# Patient Record
Sex: MALE | Race: WHITE | Employment: UNEMPLOYED | URBAN - NONMETROPOLITAN AREA
[De-identification: names, ages, dates, MRNs, and addresses within clinical notes are randomized per-mention and may not be internally consistent; named-entity substitution may affect disease eponyms.]

---

## 2017-09-21 ENCOUNTER — APPOINTMENT (OUTPATIENT)
Dept: GENERAL RADIOLOGY | Age: 47
End: 2017-09-21
Payer: OTHER GOVERNMENT

## 2017-09-21 ENCOUNTER — HOSPITAL ENCOUNTER (EMERGENCY)
Age: 47
Discharge: HOME | End: 2017-09-21
Attending: FAMILY MEDICINE
Payer: OTHER GOVERNMENT

## 2017-09-21 VITALS
BODY MASS INDEX: 22.81 KG/M2 | DIASTOLIC BLOOD PRESSURE: 98 MMHG | TEMPERATURE: 98.3 F | HEIGHT: 69 IN | OXYGEN SATURATION: 98 % | WEIGHT: 154 LBS | HEART RATE: 95 BPM | SYSTOLIC BLOOD PRESSURE: 136 MMHG | RESPIRATION RATE: 20 BRPM
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
## 2017-09-21 DIAGNOSIS — S81.812A LEG LACERATION, LEFT, INITIAL ENCOUNTER: ICD-10-CM

## 2017-09-21 DIAGNOSIS — T07.XXXA MULTIPLE CONTUSIONS: ICD-10-CM

## 2017-09-21 PROCEDURE — 73552 X-RAY EXAM OF FEMUR 2/>: CPT

## 2017-09-21 PROCEDURE — A6446 CONFORM BAND S W>=3" <5"/YD: HCPCS

## 2017-09-21 PROCEDURE — 6370000000 HC RX 637 (ALT 250 FOR IP): Performed by: FAMILY MEDICINE

## 2017-09-21 PROCEDURE — 12001 RPR S/N/AX/GEN/TRNK 2.5CM/<: CPT

## 2017-09-21 PROCEDURE — 99284 EMERGENCY DEPT VISIT MOD MDM: CPT

## 2017-09-21 PROCEDURE — 73590 X-RAY EXAM OF LOWER LEG: CPT

## 2017-09-21 PROCEDURE — 2500000003 HC RX 250 WO HCPCS

## 2017-09-21 PROCEDURE — 2580000003 HC RX 258: Performed by: FAMILY MEDICINE

## 2017-09-21 PROCEDURE — A6222 GAUZE <=16 IN NO W/SAL W/O B: HCPCS

## 2017-09-21 RX ORDER — LEVOFLOXACIN 500 MG/1
500 TABLET, FILM COATED ORAL ONCE
Status: COMPLETED | OUTPATIENT
Start: 2017-09-21 | End: 2017-09-21

## 2017-09-21 RX ORDER — LIDOCAINE HYDROCHLORIDE 20 MG/ML
INJECTION, SOLUTION INFILTRATION; PERINEURAL
Status: COMPLETED
Start: 2017-09-21 | End: 2017-09-21

## 2017-09-21 RX ORDER — NAPROXEN 500 MG/1
500 TABLET ORAL 2 TIMES DAILY WITH MEALS
Qty: 20 TABLET | Refills: 0 | Status: SHIPPED | OUTPATIENT
Start: 2017-09-21 | End: 2017-12-04

## 2017-09-21 RX ORDER — KETOROLAC TROMETHAMINE 30 MG/ML
30 INJECTION, SOLUTION INTRAMUSCULAR; INTRAVENOUS ONCE
Status: DISCONTINUED | OUTPATIENT
Start: 2017-09-21 | End: 2017-09-22 | Stop reason: HOSPADM

## 2017-09-21 RX ORDER — SODIUM CHLORIDE 9 MG/ML
INJECTION, SOLUTION INTRAVENOUS CONTINUOUS
Status: DISCONTINUED | OUTPATIENT
Start: 2017-09-21 | End: 2017-09-22 | Stop reason: HOSPADM

## 2017-09-21 RX ORDER — LEVOFLOXACIN 500 MG/1
500 TABLET, FILM COATED ORAL DAILY
Qty: 6 TABLET | Refills: 0 | Status: SHIPPED | OUTPATIENT
Start: 2017-09-21 | End: 2017-09-27

## 2017-09-21 RX ADMIN — LEVOFLOXACIN 500 MG: 500 TABLET, FILM COATED ORAL at 17:26

## 2017-09-21 RX ADMIN — LIDOCAINE HYDROCHLORIDE: 20 INJECTION, SOLUTION INFILTRATION; PERINEURAL at 17:29

## 2017-09-21 RX ADMIN — SODIUM CHLORIDE: 9 INJECTION, SOLUTION INTRAVENOUS at 16:34

## 2017-09-21 ASSESSMENT — PAIN SCALES - GENERAL: PAINLEVEL_OUTOF10: 9

## 2017-09-21 ASSESSMENT — PAIN DESCRIPTION - PAIN TYPE: TYPE: ACUTE PAIN

## 2017-09-21 ASSESSMENT — ENCOUNTER SYMPTOMS
FACIAL SWELLING: 0
SHORTNESS OF BREATH: 0
NAUSEA: 0
VOMITING: 0
ABDOMINAL PAIN: 0

## 2017-09-21 ASSESSMENT — PAIN DESCRIPTION - LOCATION: LOCATION: LEG

## 2017-09-21 ASSESSMENT — PAIN DESCRIPTION - ORIENTATION: ORIENTATION: LEFT;LOWER

## 2017-12-04 ENCOUNTER — APPOINTMENT (OUTPATIENT)
Dept: CT IMAGING | Age: 47
End: 2017-12-04
Payer: OTHER GOVERNMENT

## 2017-12-04 ENCOUNTER — HOSPITAL ENCOUNTER (EMERGENCY)
Age: 47
Discharge: HOME | End: 2017-12-04
Payer: OTHER GOVERNMENT

## 2017-12-04 ENCOUNTER — APPOINTMENT (OUTPATIENT)
Dept: GENERAL RADIOLOGY | Age: 47
End: 2017-12-04
Payer: OTHER GOVERNMENT

## 2017-12-04 VITALS
BODY MASS INDEX: 22.9 KG/M2 | HEART RATE: 85 BPM | TEMPERATURE: 98.1 F | SYSTOLIC BLOOD PRESSURE: 136 MMHG | OXYGEN SATURATION: 97 % | HEIGHT: 70 IN | WEIGHT: 160 LBS | DIASTOLIC BLOOD PRESSURE: 91 MMHG | RESPIRATION RATE: 14 BRPM

## 2017-12-04 DIAGNOSIS — S22.41XA CLOSED FRACTURE OF MULTIPLE RIBS OF RIGHT SIDE, INITIAL ENCOUNTER: Primary | ICD-10-CM

## 2017-12-04 LAB
ALBUMIN SERPL-MCNC: 4.4 G/DL (ref 3.5–5.1)
ALP BLD-CCNC: 89 U/L (ref 38–126)
ALT SERPL-CCNC: 13 U/L (ref 11–66)
ANION GAP SERPL CALCULATED.3IONS-SCNC: 14 MEQ/L (ref 8–16)
AST SERPL-CCNC: 17 U/L (ref 5–40)
BASOPHILS # BLD: 1 %
BASOPHILS ABSOLUTE: 0.1 THOU/MM3 (ref 0–0.1)
BILIRUB SERPL-MCNC: 0.5 MG/DL (ref 0.3–1.2)
BILIRUBIN DIRECT: < 0.2 MG/DL (ref 0–0.3)
BUN BLDV-MCNC: 11 MG/DL (ref 7–22)
CALCIUM SERPL-MCNC: 9.3 MG/DL (ref 8.5–10.5)
CHLORIDE BLD-SCNC: 100 MEQ/L (ref 98–111)
CO2: 25 MEQ/L (ref 23–33)
CREAT SERPL-MCNC: 0.8 MG/DL (ref 0.4–1.2)
EKG ATRIAL RATE: 84 BPM
EKG P AXIS: 66 DEGREES
EKG P-R INTERVAL: 170 MS
EKG Q-T INTERVAL: 344 MS
EKG QRS DURATION: 82 MS
EKG QTC CALCULATION (BAZETT): 406 MS
EKG R AXIS: 79 DEGREES
EKG T AXIS: 62 DEGREES
EKG VENTRICULAR RATE: 84 BPM
EOSINOPHIL # BLD: 8.9 %
EOSINOPHILS ABSOLUTE: 0.7 THOU/MM3 (ref 0–0.4)
GFR SERPL CREATININE-BSD FRML MDRD: > 90 ML/MIN/1.73M2
GLUCOSE BLD-MCNC: 107 MG/DL (ref 70–108)
HCT VFR BLD CALC: 43.4 % (ref 42–52)
HEMOGLOBIN: 14.3 GM/DL (ref 14–18)
LACTIC ACID: 1 MMOL/L (ref 0.5–2.2)
LIPASE: 16.9 U/L (ref 5.6–51.3)
LYMPHOCYTES # BLD: 36.3 %
LYMPHOCYTES ABSOLUTE: 2.8 THOU/MM3 (ref 1–4.8)
MCH RBC QN AUTO: 30.3 PG (ref 27–31)
MCHC RBC AUTO-ENTMCNC: 33 GM/DL (ref 33–37)
MCV RBC AUTO: 91.7 FL (ref 80–94)
MONOCYTES # BLD: 8.4 %
MONOCYTES ABSOLUTE: 0.7 THOU/MM3 (ref 0.4–1.3)
NUCLEATED RED BLOOD CELLS: 0 /100 WBC
OSMOLALITY CALCULATION: 277.4 MOSMOL/KG (ref 275–300)
PDW BLD-RTO: 13.9 % (ref 11.5–14.5)
PLATELET # BLD: 383 THOU/MM3 (ref 130–400)
PMV BLD AUTO: 8.1 MCM (ref 7.4–10.4)
POTASSIUM SERPL-SCNC: 4.3 MEQ/L (ref 3.5–5.2)
RBC # BLD: 4.73 MILL/MM3 (ref 4.7–6.1)
SEG NEUTROPHILS: 45.4 %
SEGMENTED NEUTROPHILS ABSOLUTE COUNT: 3.5 THOU/MM3 (ref 1.8–7.7)
SODIUM BLD-SCNC: 139 MEQ/L (ref 135–145)
TOTAL PROTEIN: 7.1 G/DL (ref 6.1–8)
TROPONIN T: < 0.01 NG/ML
WBC # BLD: 7.8 THOU/MM3 (ref 4.8–10.8)

## 2017-12-04 PROCEDURE — 83605 ASSAY OF LACTIC ACID: CPT

## 2017-12-04 PROCEDURE — 99285 EMERGENCY DEPT VISIT HI MDM: CPT

## 2017-12-04 PROCEDURE — 76376 3D RENDER W/INTRP POSTPROCES: CPT

## 2017-12-04 PROCEDURE — 85025 COMPLETE CBC W/AUTO DIFF WBC: CPT

## 2017-12-04 PROCEDURE — 96374 THER/PROPH/DIAG INJ IV PUSH: CPT

## 2017-12-04 PROCEDURE — 84484 ASSAY OF TROPONIN QUANT: CPT

## 2017-12-04 PROCEDURE — 6370000000 HC RX 637 (ALT 250 FOR IP): Performed by: PHYSICIAN ASSISTANT

## 2017-12-04 PROCEDURE — 71100 X-RAY EXAM RIBS UNI 2 VIEWS: CPT

## 2017-12-04 PROCEDURE — 82248 BILIRUBIN DIRECT: CPT

## 2017-12-04 PROCEDURE — 74177 CT ABD & PELVIS W/CONTRAST: CPT

## 2017-12-04 PROCEDURE — 93005 ELECTROCARDIOGRAM TRACING: CPT

## 2017-12-04 PROCEDURE — 83690 ASSAY OF LIPASE: CPT

## 2017-12-04 PROCEDURE — 36415 COLL VENOUS BLD VENIPUNCTURE: CPT

## 2017-12-04 PROCEDURE — 6360000002 HC RX W HCPCS: Performed by: PHYSICIAN ASSISTANT

## 2017-12-04 PROCEDURE — 80053 COMPREHEN METABOLIC PANEL: CPT

## 2017-12-04 PROCEDURE — 6360000004 HC RX CONTRAST MEDICATION: Performed by: PHYSICIAN ASSISTANT

## 2017-12-04 RX ORDER — IBUPROFEN 800 MG/1
800 TABLET ORAL EVERY 8 HOURS PRN
Qty: 30 TABLET | Refills: 0 | Status: SHIPPED | OUTPATIENT
Start: 2017-12-04

## 2017-12-04 RX ORDER — TAMSULOSIN HYDROCHLORIDE 0.4 MG/1
0.4 CAPSULE ORAL DAILY
Status: DISCONTINUED | OUTPATIENT
Start: 2017-12-04 | End: 2017-12-04 | Stop reason: HOSPADM

## 2017-12-04 RX ORDER — KETOROLAC TROMETHAMINE 30 MG/ML
30 INJECTION, SOLUTION INTRAMUSCULAR; INTRAVENOUS ONCE
Status: COMPLETED | OUTPATIENT
Start: 2017-12-04 | End: 2017-12-04

## 2017-12-04 RX ADMIN — IOPAMIDOL 80 ML: 755 INJECTION, SOLUTION INTRAVENOUS at 08:40

## 2017-12-04 RX ADMIN — TAMSULOSIN HYDROCHLORIDE 0.4 MG: 0.4 CAPSULE ORAL at 07:58

## 2017-12-04 RX ADMIN — KETOROLAC TROMETHAMINE 30 MG: 30 INJECTION, SOLUTION INTRAMUSCULAR at 07:58

## 2017-12-04 ASSESSMENT — PAIN SCALES - GENERAL
PAINLEVEL_OUTOF10: 6
PAINLEVEL_OUTOF10: 10
PAINLEVEL_OUTOF10: 0
PAINLEVEL_OUTOF10: 10

## 2017-12-04 ASSESSMENT — PAIN DESCRIPTION - LOCATION
LOCATION: BACK;FLANK
LOCATION: BACK

## 2017-12-04 ASSESSMENT — ENCOUNTER SYMPTOMS
NAUSEA: 0
VOMITING: 0
BACK PAIN: 0
RHINORRHEA: 0
WHEEZING: 0
COUGH: 0
CONSTIPATION: 0
EYE DISCHARGE: 0
EYE REDNESS: 0
SHORTNESS OF BREATH: 0
ABDOMINAL PAIN: 0
DIARRHEA: 0
SORE THROAT: 0
COLOR CHANGE: 0

## 2017-12-04 ASSESSMENT — PAIN DESCRIPTION - ORIENTATION
ORIENTATION: RIGHT
ORIENTATION: RIGHT;UPPER

## 2017-12-04 ASSESSMENT — PAIN DESCRIPTION - PAIN TYPE
TYPE: ACUTE PAIN
TYPE: ACUTE PAIN

## 2017-12-04 ASSESSMENT — PAIN DESCRIPTION - FREQUENCY: FREQUENCY: CONTINUOUS

## 2017-12-04 NOTE — ED NOTES
Pt reports reduction of pain after Toradol. EKG completed. Call light in reach.      Carmita Knowles RN  12/04/17 208

## 2017-12-04 NOTE — ED NOTES
Bed: 024A  Expected date: 12/4/17  Expected time: 6:51 AM  Means of arrival: [facility]  Comments:     Kathee Boas, RN  12/04/17 5446

## 2017-12-04 NOTE — ED PROVIDER NOTES
[facility] [address] [address] EMERGENCY DEPT      CHIEF COMPLAINT       Chief Complaint   Patient presents with    Back Pain       Nurses Notes reviewed and I agree except as noted in the HPI. HISTORY OF PRESENT ILLNESS    Janel Verdin is a 55 y.o. male who presents to the emergency department for evaluation of right flank pain. The patient has a history of lumbar degenerative disc disease and polysubstance abuse. The patient is requesting no narcotic pain medications during his stay. The patient states that he has had right flank pain for the past several days which became significantly worse last night. He denies any known injury or increase in his chronic low back pain. The patient states that this pain radiates around the right side of his upper flank, but he denies chest or abdominal pain. The patient rates his current right flank pain as a continuous 10/10. The patient also reports having wrestled with a friend of his approximate 10 days ago and falling onto his right side. He stated that his right ribs are sore after this incident but that the pain improved. He denied any swelling or bruising at that time. The patient denies any burning or pain with urination or blood in his urine. He denies urinary urgency, frequency, hesitancy, or retention. He denies a history of kidney stones. He denies nausea, vomiting, diarrhea, or constipation. He denies shortness of breath, fevers, or chills. The patient has no additional complaints at this time. REVIEW OF SYSTEMS     Review of Systems   Constitutional: Negative for chills, fatigue and fever. HENT: Negative for congestion, ear pain, rhinorrhea and sore throat. Eyes: Negative for discharge and redness. Respiratory: Negative for cough, shortness of breath and wheezing. Cardiovascular: Negative for chest pain and palpitations. Gastrointestinal: Negative for abdominal pain, constipation, diarrhea, nausea and vomiting.    Genitourinary: Positive for flank pain (right). Negative for decreased urine volume, difficulty urinating, dysuria, frequency, hematuria and urgency. Musculoskeletal: Negative for arthralgias, back pain, myalgias, neck pain and neck stiffness. Skin: Negative for color change, pallor and rash. Neurological: Negative for dizziness, syncope, weakness, light-headedness, numbness and headaches. Hematological: Negative for adenopathy. Psychiatric/Behavioral: Negative for agitation, confusion, dysphoric mood and suicidal ideas. The patient is not nervous/anxious. PAST MEDICAL HISTORY    has a past medical history of Asthma; DDD (degenerative disc disease), lumbar; and PTSD (post-traumatic stress disorder). SURGICAL HISTORY      has a past surgical history that includes fracture surgery. CURRENT MEDICATIONS       Discharge Medication List as of 12/4/2017 10:54 AM      CONTINUE these medications which have NOT CHANGED    Details   ALBUTEROL SULFATE IN Inhale  into the lungs. ALLERGIES     is allergic to benadryl [diphenhydramine]; other; and pcn [penicillins]. FAMILY HISTORY     has no family status information on file. family history is not on file. SOCIAL HISTORY      reports that he has been smoking Cigars. He has been smoking about 0.50 packs per day. He has never used smokeless tobacco. He reports that he drinks alcohol. He reports that he uses drugs, including Opiates  and Cocaine. PHYSICAL EXAM     INITIAL VITALS:  height is 5' 10\" (1.778 m) and weight is 160 lb (72.6 kg). His oral temperature is 98.1 °F (36.7 °C). His blood pressure is 136/91 (abnormal) and his pulse is 85. His respiration is 14 and oxygen saturation is 97%. Physical Exam   Constitutional: He is oriented to person, place, and time. He appears well-developed and well-nourished. No distress. HENT:   Head: Normocephalic and atraumatic.    Right Ear: External ear normal.   Left Ear: External ear normal.   Nose: Nose normal. Mouth/Throat: Oropharynx is clear and moist.   Eyes: Conjunctivae and EOM are normal. Pupils are equal, round, and reactive to light. Right eye exhibits no discharge. Left eye exhibits no discharge. No scleral icterus. Neck: Normal range of motion. Neck supple. Cardiovascular: Normal rate, regular rhythm and normal heart sounds. Exam reveals no gallop and no friction rub. No murmur heard. Pulmonary/Chest: Effort normal and breath sounds normal. No respiratory distress. He has no wheezes. He has no rales. He exhibits no tenderness. Abdominal: Soft. Bowel sounds are normal. He exhibits no distension and no mass. There is no hepatosplenomegaly. There is no tenderness. There is CVA tenderness (right). There is no rigidity, no rebound, no guarding, no tenderness at McBurney's point and negative Lindsay's sign. No hernia. Musculoskeletal: Normal range of motion. He exhibits tenderness. He exhibits no edema or deformity. Cervical back: Normal. He exhibits normal range of motion, no tenderness, no swelling and no deformity. Thoracic back: Normal. He exhibits normal range of motion, no tenderness, no swelling and no deformity. Lumbar back: He exhibits tenderness. He exhibits normal range of motion, no swelling and no deformity. There is right lumbar paraspinal tenderness but no midline lumbar spinal tenderness to palpation. Lymphadenopathy:     He has no cervical adenopathy. Neurological: He is alert and oriented to person, place, and time. He exhibits normal muscle tone. Coordination normal. GCS eye subscore is 4. GCS verbal subscore is 5. GCS motor subscore is 6. Skin: Skin is warm and dry. No rash noted. He is not diaphoretic. No erythema. No pallor. Psychiatric: He has a normal mood and affect. His behavior is normal. Thought content normal.   Nursing note and vitals reviewed.             DIFFERENTIAL DIAGNOSIS:   Includes but not limited to: Kidney or ureteral stone, UTI, pyelonephritis, paraspinal strain vs sprain vs paraspinal muscle spasm, herniated disc, degenerative disc disease. Low suspicion for: vertebral subluxation or fracture, discitis, epidural abscess, central canal compromise, or AAA based on history and physical exam.    DIAGNOSTIC RESULTS     EKG: All EKG's are interpreted by the Emergency Department Physician who either signs or Co-signs this chart in the absence of a cardiologist.  Signa Albino. Rate: 84 bpm  WA interval: 170 ms  QRS duration: 82 ms  QTc: 344/406 ms  P-R-T axes: 66, 79, 62  Normal sinus rhythm. Early repolarization. Normal EKG. No STEMI. RADIOLOGY: non-plain film images(s) such as CT, Ultrasound and MRI are read by the radiologist.  Plain radiographic images are visualized and preliminarily interpreted by the emergency physician unless otherwise stated below. XR RIBS RIGHT (2 VIEWS)   Final Result      1. Acute nondisplaced fractures of the anterior aspects of the right sixth and seventh ribs. 2. No pneumothorax. **This report has been created using voice recognition software. It may contain minor errors which are inherent in voice recognition technology. **      Final report electronically signed by Dr. Haroldo Tong on 12/4/2017 10:34 AM      CT ABDOMEN PELVIS W IV CONTRAST Additional Contrast? None   Final Result    No evidence of an acute process in the abdomen or pelvis. **This report has been created using voice recognition software. It may contain minor errors which are inherent in voice recognition technology. **      Final report electronically signed by Dr. Haroldo Tong on 12/4/2017 9:20 AM      CT Lumbar Reconstruction WO Post Process   Final Result   Degenerative changes L5-S1 level, as described above. No acute findings. **This report has been created using voice recognition software. It may contain minor errors which are inherent in voice recognition technology. **      Final report electronically

## 2017-12-04 NOTE — ED NOTES
Pt resting on cot with eyes closed. Arouses easily to voice. Denies pain. Awaiting all results and plan of care. Rails up x 2.      Carmita Knowles RN  12/04/17 0396

## 2020-05-17 ENCOUNTER — HOSPITAL ENCOUNTER (INPATIENT)
Age: 50
LOS: 1 days | Discharge: LEFT BEFORE DISCHARGE | DRG: 439 | End: 2020-05-18
Attending: INTERNAL MEDICINE | Admitting: INTERNAL MEDICINE
Payer: OTHER GOVERNMENT

## 2020-05-17 ENCOUNTER — APPOINTMENT (OUTPATIENT)
Dept: CT IMAGING | Age: 50
DRG: 439 | End: 2020-05-17
Payer: OTHER GOVERNMENT

## 2020-05-17 PROBLEM — K85.90 ACUTE ON CHRONIC PANCREATITIS (HCC): Status: ACTIVE | Noted: 2020-05-17

## 2020-05-17 PROBLEM — K86.1 ACUTE ON CHRONIC PANCREATITIS (HCC): Status: ACTIVE | Noted: 2020-05-17

## 2020-05-17 LAB
ALBUMIN SERPL-MCNC: 4.9 G/DL (ref 3.5–5.1)
ALP BLD-CCNC: 89 U/L (ref 38–126)
ALT SERPL-CCNC: 15 U/L (ref 11–66)
AMPHETAMINE+METHAMPHETAMINE URINE SCREEN: NEGATIVE
ANION GAP SERPL CALCULATED.3IONS-SCNC: 17 MEQ/L (ref 8–16)
AST SERPL-CCNC: 25 U/L (ref 5–40)
BACTERIA: ABNORMAL /HPF
BARBITURATE QUANTITATIVE URINE: NEGATIVE
BASOPHILS # BLD: 0.3 %
BASOPHILS ABSOLUTE: 0 THOU/MM3 (ref 0–0.1)
BENZODIAZEPINE QUANTITATIVE URINE: NEGATIVE
BILIRUB SERPL-MCNC: 0.8 MG/DL (ref 0.3–1.2)
BILIRUBIN DIRECT: < 0.2 MG/DL (ref 0–0.3)
BILIRUBIN URINE: NEGATIVE
BLOOD, URINE: ABNORMAL
BUN BLDV-MCNC: 12 MG/DL (ref 7–22)
CALCIUM SERPL-MCNC: 10.1 MG/DL (ref 8.5–10.5)
CANNABINOID QUANTITATIVE URINE: NEGATIVE
CASTS 2: ABNORMAL /LPF
CASTS UA: ABNORMAL /LPF
CHARACTER, URINE: CLEAR
CHLORIDE BLD-SCNC: 94 MEQ/L (ref 98–111)
CO2: 23 MEQ/L (ref 23–33)
COCAINE METABOLITE QUANTITATIVE URINE: POSITIVE
COLOR: YELLOW
CREAT SERPL-MCNC: 0.8 MG/DL (ref 0.4–1.2)
CRYSTALS, UA: ABNORMAL
EKG ATRIAL RATE: 92 BPM
EKG P AXIS: 59 DEGREES
EKG P-R INTERVAL: 134 MS
EKG Q-T INTERVAL: 358 MS
EKG QRS DURATION: 84 MS
EKG QTC CALCULATION (BAZETT): 442 MS
EKG R AXIS: 78 DEGREES
EKG T AXIS: 73 DEGREES
EKG VENTRICULAR RATE: 92 BPM
EOSINOPHIL # BLD: 0.4 %
EOSINOPHILS ABSOLUTE: 0.1 THOU/MM3 (ref 0–0.4)
EPITHELIAL CELLS, UA: ABNORMAL /HPF
ERYTHROCYTE [DISTWIDTH] IN BLOOD BY AUTOMATED COUNT: 14 % (ref 11.5–14.5)
ERYTHROCYTE [DISTWIDTH] IN BLOOD BY AUTOMATED COUNT: 47.9 FL (ref 35–45)
GFR SERPL CREATININE-BSD FRML MDRD: > 90 ML/MIN/1.73M2
GLUCOSE BLD-MCNC: 125 MG/DL (ref 70–108)
GLUCOSE URINE: NEGATIVE MG/DL
HCT VFR BLD CALC: 46.6 % (ref 42–52)
HEMOGLOBIN: 15.2 GM/DL (ref 14–18)
IMMATURE GRANS (ABS): 0.04 THOU/MM3 (ref 0–0.07)
IMMATURE GRANULOCYTES: 0.3 %
KETONES, URINE: 80
LEUKOCYTE ESTERASE, URINE: NEGATIVE
LIPASE: 2457.3 U/L (ref 5.6–51.3)
LYMPHOCYTES # BLD: 12.2 %
LYMPHOCYTES ABSOLUTE: 1.6 THOU/MM3 (ref 1–4.8)
MCH RBC QN AUTO: 30.3 PG (ref 26–33)
MCHC RBC AUTO-ENTMCNC: 32.6 GM/DL (ref 32.2–35.5)
MCV RBC AUTO: 93 FL (ref 80–94)
MISCELLANEOUS 2: ABNORMAL
MONOCYTES # BLD: 8 %
MONOCYTES ABSOLUTE: 1.1 THOU/MM3 (ref 0.4–1.3)
NITRITE, URINE: NEGATIVE
NUCLEATED RED BLOOD CELLS: 0 /100 WBC
OPIATES, URINE: POSITIVE
OSMOLALITY CALCULATION: 269.5 MOSMOL/KG (ref 275–300)
OXYCODONE: NEGATIVE
PH UA: 5 (ref 5–9)
PHENCYCLIDINE QUANTITATIVE URINE: NEGATIVE
PLATELET # BLD: 250 THOU/MM3 (ref 130–400)
PMV BLD AUTO: 9.8 FL (ref 9.4–12.4)
POTASSIUM SERPL-SCNC: 4.1 MEQ/L (ref 3.5–5.2)
PROTEIN UA: 100
RBC # BLD: 5.01 MILL/MM3 (ref 4.7–6.1)
RBC URINE: ABNORMAL /HPF
RENAL EPITHELIAL, UA: ABNORMAL
SEG NEUTROPHILS: 78.8 %
SEGMENTED NEUTROPHILS ABSOLUTE COUNT: 10.4 THOU/MM3 (ref 1.8–7.7)
SODIUM BLD-SCNC: 134 MEQ/L (ref 135–145)
SPECIFIC GRAVITY, URINE: > 1.03 (ref 1–1.03)
TOTAL PROTEIN: 8 G/DL (ref 6.1–8)
TROPONIN T: < 0.01 NG/ML
UROBILINOGEN, URINE: 0.2 EU/DL (ref 0–1)
WBC # BLD: 13.2 THOU/MM3 (ref 4.8–10.8)
WBC UA: ABNORMAL /HPF
YEAST: ABNORMAL

## 2020-05-17 PROCEDURE — 6360000004 HC RX CONTRAST MEDICATION: Performed by: PHYSICIAN ASSISTANT

## 2020-05-17 PROCEDURE — 83690 ASSAY OF LIPASE: CPT

## 2020-05-17 PROCEDURE — 96375 TX/PRO/DX INJ NEW DRUG ADDON: CPT

## 2020-05-17 PROCEDURE — 6360000002 HC RX W HCPCS

## 2020-05-17 PROCEDURE — 96374 THER/PROPH/DIAG INJ IV PUSH: CPT

## 2020-05-17 PROCEDURE — 36415 COLL VENOUS BLD VENIPUNCTURE: CPT

## 2020-05-17 PROCEDURE — 80307 DRUG TEST PRSMV CHEM ANLYZR: CPT

## 2020-05-17 PROCEDURE — 6360000002 HC RX W HCPCS: Performed by: INTERNAL MEDICINE

## 2020-05-17 PROCEDURE — 80053 COMPREHEN METABOLIC PANEL: CPT

## 2020-05-17 PROCEDURE — 93005 ELECTROCARDIOGRAM TRACING: CPT | Performed by: PHYSICIAN ASSISTANT

## 2020-05-17 PROCEDURE — 6360000002 HC RX W HCPCS: Performed by: PHYSICIAN ASSISTANT

## 2020-05-17 PROCEDURE — 6370000000 HC RX 637 (ALT 250 FOR IP): Performed by: INTERNAL MEDICINE

## 2020-05-17 PROCEDURE — 1200000003 HC TELEMETRY R&B

## 2020-05-17 PROCEDURE — 74177 CT ABD & PELVIS W/CONTRAST: CPT

## 2020-05-17 PROCEDURE — 84484 ASSAY OF TROPONIN QUANT: CPT

## 2020-05-17 PROCEDURE — 2580000003 HC RX 258: Performed by: PHYSICIAN ASSISTANT

## 2020-05-17 PROCEDURE — 82248 BILIRUBIN DIRECT: CPT

## 2020-05-17 PROCEDURE — C9113 INJ PANTOPRAZOLE SODIUM, VIA: HCPCS | Performed by: PHYSICIAN ASSISTANT

## 2020-05-17 PROCEDURE — 2500000003 HC RX 250 WO HCPCS: Performed by: INTERNAL MEDICINE

## 2020-05-17 PROCEDURE — 81001 URINALYSIS AUTO W/SCOPE: CPT

## 2020-05-17 PROCEDURE — 85025 COMPLETE CBC W/AUTO DIFF WBC: CPT

## 2020-05-17 PROCEDURE — 2580000003 HC RX 258: Performed by: INTERNAL MEDICINE

## 2020-05-17 PROCEDURE — 96376 TX/PRO/DX INJ SAME DRUG ADON: CPT

## 2020-05-17 PROCEDURE — 99223 1ST HOSP IP/OBS HIGH 75: CPT | Performed by: INTERNAL MEDICINE

## 2020-05-17 PROCEDURE — 99284 EMERGENCY DEPT VISIT MOD MDM: CPT

## 2020-05-17 PROCEDURE — 93010 ELECTROCARDIOGRAM REPORT: CPT | Performed by: INTERNAL MEDICINE

## 2020-05-17 RX ORDER — HYDRALAZINE HYDROCHLORIDE 20 MG/ML
10 INJECTION INTRAMUSCULAR; INTRAVENOUS EVERY 4 HOURS PRN
Status: DISCONTINUED | OUTPATIENT
Start: 2020-05-17 | End: 2020-05-18 | Stop reason: HOSPADM

## 2020-05-17 RX ORDER — ACETAMINOPHEN 650 MG/1
650 SUPPOSITORY RECTAL EVERY 6 HOURS PRN
Status: DISCONTINUED | OUTPATIENT
Start: 2020-05-17 | End: 2020-05-18 | Stop reason: HOSPADM

## 2020-05-17 RX ORDER — POLYETHYLENE GLYCOL 3350 17 G/17G
17 POWDER, FOR SOLUTION ORAL DAILY PRN
Status: DISCONTINUED | OUTPATIENT
Start: 2020-05-17 | End: 2020-05-18 | Stop reason: HOSPADM

## 2020-05-17 RX ORDER — MORPHINE SULFATE 4 MG/ML
4 INJECTION, SOLUTION INTRAMUSCULAR; INTRAVENOUS ONCE
Status: COMPLETED | OUTPATIENT
Start: 2020-05-17 | End: 2020-05-17

## 2020-05-17 RX ORDER — 0.9 % SODIUM CHLORIDE 0.9 %
1000 INTRAVENOUS SOLUTION INTRAVENOUS ONCE
Status: COMPLETED | OUTPATIENT
Start: 2020-05-17 | End: 2020-05-17

## 2020-05-17 RX ORDER — NICOTINE 21 MG/24HR
1 PATCH, TRANSDERMAL 24 HOURS TRANSDERMAL DAILY
Status: DISCONTINUED | OUTPATIENT
Start: 2020-05-17 | End: 2020-05-18 | Stop reason: HOSPADM

## 2020-05-17 RX ORDER — SODIUM CHLORIDE 9 MG/ML
INJECTION, SOLUTION INTRAVENOUS CONTINUOUS
Status: DISCONTINUED | OUTPATIENT
Start: 2020-05-17 | End: 2020-05-18 | Stop reason: HOSPADM

## 2020-05-17 RX ORDER — ONDANSETRON 2 MG/ML
4 INJECTION INTRAMUSCULAR; INTRAVENOUS ONCE
Status: COMPLETED | OUTPATIENT
Start: 2020-05-17 | End: 2020-05-17

## 2020-05-17 RX ORDER — SODIUM CHLORIDE 0.9 % (FLUSH) 0.9 %
10 SYRINGE (ML) INJECTION PRN
Status: DISCONTINUED | OUTPATIENT
Start: 2020-05-17 | End: 2020-05-18 | Stop reason: HOSPADM

## 2020-05-17 RX ORDER — ONDANSETRON 2 MG/ML
4 INJECTION INTRAMUSCULAR; INTRAVENOUS EVERY 6 HOURS PRN
Status: DISCONTINUED | OUTPATIENT
Start: 2020-05-17 | End: 2020-05-18 | Stop reason: HOSPADM

## 2020-05-17 RX ORDER — ACETAMINOPHEN 325 MG/1
650 TABLET ORAL EVERY 6 HOURS PRN
Status: DISCONTINUED | OUTPATIENT
Start: 2020-05-17 | End: 2020-05-18 | Stop reason: HOSPADM

## 2020-05-17 RX ORDER — PRAZOSIN HYDROCHLORIDE 1 MG/1
1 CAPSULE ORAL NIGHTLY
Status: DISCONTINUED | OUTPATIENT
Start: 2020-05-17 | End: 2020-05-18 | Stop reason: HOSPADM

## 2020-05-17 RX ORDER — ONDANSETRON 2 MG/ML
INJECTION INTRAMUSCULAR; INTRAVENOUS
Status: COMPLETED
Start: 2020-05-17 | End: 2020-05-17

## 2020-05-17 RX ORDER — SODIUM CHLORIDE 0.9 % (FLUSH) 0.9 %
10 SYRINGE (ML) INJECTION EVERY 12 HOURS SCHEDULED
Status: DISCONTINUED | OUTPATIENT
Start: 2020-05-17 | End: 2020-05-18 | Stop reason: HOSPADM

## 2020-05-17 RX ORDER — KETOROLAC TROMETHAMINE 30 MG/ML
15 INJECTION, SOLUTION INTRAMUSCULAR; INTRAVENOUS EVERY 6 HOURS PRN
Status: DISCONTINUED | OUTPATIENT
Start: 2020-05-17 | End: 2020-05-18 | Stop reason: HOSPADM

## 2020-05-17 RX ORDER — ZOLPIDEM TARTRATE 5 MG/1
5 TABLET ORAL NIGHTLY PRN
Status: DISCONTINUED | OUTPATIENT
Start: 2020-05-17 | End: 2020-05-18 | Stop reason: HOSPADM

## 2020-05-17 RX ORDER — PROMETHAZINE HYDROCHLORIDE 25 MG/1
12.5 TABLET ORAL EVERY 6 HOURS PRN
Status: DISCONTINUED | OUTPATIENT
Start: 2020-05-17 | End: 2020-05-18 | Stop reason: HOSPADM

## 2020-05-17 RX ORDER — PANTOPRAZOLE SODIUM 40 MG/10ML
40 INJECTION, POWDER, LYOPHILIZED, FOR SOLUTION INTRAVENOUS ONCE
Status: COMPLETED | OUTPATIENT
Start: 2020-05-17 | End: 2020-05-17

## 2020-05-17 RX ADMIN — ONDANSETRON HYDROCHLORIDE 4 MG: 2 SOLUTION INTRAMUSCULAR; INTRAVENOUS at 12:52

## 2020-05-17 RX ADMIN — HYDROMORPHONE HYDROCHLORIDE 0.5 MG: 1 INJECTION, SOLUTION INTRAMUSCULAR; INTRAVENOUS; SUBCUTANEOUS at 17:10

## 2020-05-17 RX ADMIN — HYDROMORPHONE HYDROCHLORIDE 0.5 MG: 1 INJECTION, SOLUTION INTRAMUSCULAR; INTRAVENOUS; SUBCUTANEOUS at 12:53

## 2020-05-17 RX ADMIN — SODIUM CHLORIDE: 9 INJECTION, SOLUTION INTRAVENOUS at 13:38

## 2020-05-17 RX ADMIN — ONDANSETRON 4 MG: 2 INJECTION INTRAMUSCULAR; INTRAVENOUS at 12:52

## 2020-05-17 RX ADMIN — MORPHINE SULFATE 4 MG: 4 INJECTION, SOLUTION INTRAMUSCULAR; INTRAVENOUS at 12:14

## 2020-05-17 RX ADMIN — ZOLPIDEM TARTRATE 5 MG: 5 TABLET ORAL at 22:05

## 2020-05-17 RX ADMIN — PRAZOSIN HYDROCHLORIDE 1 MG: 1 CAPSULE ORAL at 19:42

## 2020-05-17 RX ADMIN — SODIUM CHLORIDE 1000 ML: 9 INJECTION, SOLUTION INTRAVENOUS at 11:22

## 2020-05-17 RX ADMIN — ACETAMINOPHEN 650 MG: 325 TABLET ORAL at 15:20

## 2020-05-17 RX ADMIN — KETOROLAC TROMETHAMINE 15 MG: 30 INJECTION, SOLUTION INTRAMUSCULAR at 19:42

## 2020-05-17 RX ADMIN — IOPAMIDOL 80 ML: 755 INJECTION, SOLUTION INTRAVENOUS at 12:36

## 2020-05-17 RX ADMIN — ONDANSETRON HYDROCHLORIDE 4 MG: 2 SOLUTION INTRAMUSCULAR; INTRAVENOUS at 11:22

## 2020-05-17 RX ADMIN — FAMOTIDINE 20 MG: 10 INJECTION INTRAVENOUS at 19:42

## 2020-05-17 RX ADMIN — ONDANSETRON HYDROCHLORIDE 4 MG: 2 SOLUTION INTRAMUSCULAR; INTRAVENOUS at 22:05

## 2020-05-17 RX ADMIN — HYDROMORPHONE HYDROCHLORIDE 0.5 MG: 1 INJECTION, SOLUTION INTRAMUSCULAR; INTRAVENOUS; SUBCUTANEOUS at 21:08

## 2020-05-17 RX ADMIN — PANTOPRAZOLE SODIUM 40 MG: 40 INJECTION, POWDER, FOR SOLUTION INTRAVENOUS at 11:22

## 2020-05-17 RX ADMIN — ONDANSETRON HYDROCHLORIDE 4 MG: 2 SOLUTION INTRAMUSCULAR; INTRAVENOUS at 16:07

## 2020-05-17 RX ADMIN — HYDRALAZINE HYDROCHLORIDE 10 MG: 20 INJECTION, SOLUTION INTRAMUSCULAR; INTRAVENOUS at 15:15

## 2020-05-17 RX ADMIN — MORPHINE SULFATE 4 MG: 4 INJECTION, SOLUTION INTRAMUSCULAR; INTRAVENOUS at 11:22

## 2020-05-17 ASSESSMENT — ENCOUNTER SYMPTOMS
SHORTNESS OF BREATH: 0
CONSTIPATION: 0
BLOOD IN STOOL: 0
SINUS PAIN: 0
SINUS PRESSURE: 0
ABDOMINAL PAIN: 1
NAUSEA: 1
VOMITING: 1
EYE PAIN: 0
ABDOMINAL DISTENTION: 0
SORE THROAT: 0
COUGH: 0
DIARRHEA: 0

## 2020-05-17 ASSESSMENT — PAIN DESCRIPTION - LOCATION: LOCATION: ABDOMEN

## 2020-05-17 ASSESSMENT — PAIN DESCRIPTION - ONSET: ONSET: ON-GOING

## 2020-05-17 ASSESSMENT — PAIN SCALES - GENERAL
PAINLEVEL_OUTOF10: 10
PAINLEVEL_OUTOF10: 10
PAINLEVEL_OUTOF10: 5
PAINLEVEL_OUTOF10: 10
PAINLEVEL_OUTOF10: 10
PAINLEVEL_OUTOF10: 8
PAINLEVEL_OUTOF10: 8

## 2020-05-17 ASSESSMENT — PAIN DESCRIPTION - DESCRIPTORS: DESCRIPTORS: SQUEEZING;STABBING;PRESSURE

## 2020-05-17 ASSESSMENT — PAIN DESCRIPTION - FREQUENCY: FREQUENCY: CONTINUOUS

## 2020-05-17 ASSESSMENT — PAIN DESCRIPTION - ORIENTATION: ORIENTATION: LEFT

## 2020-05-17 ASSESSMENT — PAIN DESCRIPTION - PAIN TYPE: TYPE: ACUTE PAIN

## 2020-05-17 NOTE — H&P
History & Physical        Patient:  Gaudencio Lynch  YOB: 1970    MRN: [id number]     Acct: [de-identified]    PCP: No primary care provider on file. Date of Admission: 5/17/2020    Date of Service: Pt seen/examined on 5/17/2020   and Admitted to Inpatient with expected LOS greater than two midnights due to medical therapy. Chief Complaint:  Nausea, vomiting, and intractable abdominal pain       History Of Present Illness:      52 y.o. male who presented to [address] [place] [address] with 3 day history of nausea, vomiting, and epigastric abdominal pain. Reports sharp 10/10 abdominal pain, radiates to his back. Associated with nausea and vomiting. Pt has a pmh of polysubstance abuse (admits to recent cocaine use) and reports abusing his albuterol inhaler. Pt used to be a heavy alcohol drinker with prior episodes of pancreatitis. Pt denies any recent alcohol use. No fevers or chills. No chest pain or sob. No diarrhea, blood in stool. No cough, congestion, sore throat. Pt denies any new medication changes, no history of gallstones or recent travel. In the ED, tachycardic 118. LFT's WNL. Lipase elevated at 2,457.3. CT A/P showing soft tissue stranding adjacent to the pancreatic tail consistent with acute pancreatitis with mild bowel ileus. Will admit for acute pancreatitis     Past Medical History:          Diagnosis Date    Asthma     DDD (degenerative disc disease), lumbar     Pancreatitis     PTSD (post-traumatic stress disorder)        Past Surgical History:          Procedure Laterality Date    FRACTURE SURGERY      collar bone       Medications Prior to Admission:      Prior to Admission medications    Medication Sig Start Date End Date Taking? Authorizing Provider   ibuprofen (ADVIL;MOTRIN) 800 MG tablet Take 1 tablet by mouth every 8 hours as needed for Pain 12/4/17  Yes Chelly Miles PA-C   ALBUTEROL SULFATE IN Inhale  into the lungs.    Yes Historical Provider, MD Allergies:  Benadryl [diphenhydramine]; Other; and Pcn [penicillins]    Social History:     Social History     Socioeconomic History    Marital status: Single     Spouse name: Not on file    Number of children: Not on file    Years of education: Not on file    Highest education level: Not on file   Occupational History    Not on file   Social Needs    Financial resource strain: Not on file    Food insecurity     Worry: Not on file     Inability: Not on file    Transportation needs     Medical: Not on file     Non-medical: Not on file   Tobacco Use    Smoking status: Current Every Day Smoker     Packs/day: 0.50     Types: Cigars    Smokeless tobacco: Never Used   Substance and Sexual Activity    Alcohol use: Yes     Comment: everyday drinker    Drug use: Yes     Types: Opiates , Cocaine     Comment: current user, last opiate use was 4/27/17    Sexual activity: Not on file   Lifestyle    Physical activity     Days per week: Not on file     Minutes per session: Not on file    Stress: Not on file   Relationships    Social connections     Talks on phone: Not on file     Gets together: Not on file     Attends Tenriism service: Not on file     Active member of club or organization: Not on file     Attends meetings of clubs or organizations: Not on file     Relationship status: Not on file    Intimate partner violence     Fear of current or ex partner: Not on file     Emotionally abused: Not on file     Physically abused: Not on file     Forced sexual activity: Not on file   Other Topics Concern    Not on file   Social History Narrative    Not on file       Family History:       Reviewed in detail and negative for DM, CAD, Cancer, CVA. Positive as follows:    No family history on file. Diet:  No diet orders on file    REVIEW OF SYSTEMS:   Pertinent positives as noted in the HPI. All other systems reviewed and negative.     PHYSICAL EXAM:    BP (!) 169/98   Pulse 115   Temp 98 °F (36.7 °C) posttraumatic stress disorder (PTSD) [Z86.59]        Assessment/Plan:    1. Acute on Chronic Pancreatitis: 3 day history of abdominal pain radiating to his back with associated nausea and vomiting. Pt has prior history of alcohol abuse with pancreatitis flare ups, however denies any recent alcohol drinking. Pt does endorse recent cocaine use and abusing his albuterol inhaler. Lipase elevated at 2,457.3. LFTs WNL. CT A/P showing soft tissue stranding adjacent to the pancreatic tail consistent with acute pancreatitis with mild bowel ileus. Will start aggressive IV fluid therapy at 200 cc/hr. NPO. IV Zofran and Pain control. 2. Mild Bowel Ileus: NPO. IV fluids. 3. Polysubstance Abuse: abusing cocaine. Educate and  on cessation. 4. PTSD: resume Zoloft and Prazosin           Thank you No primary care provider on file. for the opportunity to be involved in this patient's care.     Electronically signed by Souleymane Johnson MD on 5/17/2020 at 12:36 PM

## 2020-05-17 NOTE — ED PROVIDER NOTES
800 MG tablet Take 1 tablet by mouth every 8 hours as needed for Pain, Disp-30 tablet, R-0Print      ALBUTEROL SULFATE IN Inhale  into the lungs. ALLERGIES     is allergic to benadryl [diphenhydramine]; other; and pcn [penicillins]. HISTORY     has no family status information on file. family history is not on file. SOCIALHISTORY      reports that he has been smoking cigars. He has been smoking about 0.50 packs per day. He has never used smokeless tobacco. He reports current alcohol use. He reports current drug use. Drugs: Opiates  and Cocaine. PHYSICAL EXAM     INITIAL VITALS:  weight is 161 lb 6.4 oz (73.2 kg). His oral temperature is 98.5 °F (36.9 °C). His blood pressure is 134/81 and his pulse is 75. His respiration is 18 and oxygen saturation is 95%. Physical Exam  Vitals signs and nursing note reviewed. Constitutional:       Comments: Well Developed Well Nourished Appearing     HENT:      Head: Normocephalic and atraumatic. Eyes:      Pupils: Pupils are equal, round, and reactive to light. Neck:      Musculoskeletal: Normal range of motion and neck supple. Cardiovascular:      Rate and Rhythm: Normal rate and regular rhythm. Heart sounds: Normal heart sounds. Pulmonary:      Effort: Pulmonary effort is normal. No respiratory distress. Breath sounds: Normal breath sounds. No wheezing. Abdominal:      General: Bowel sounds are normal. There is no distension. Palpations: Abdomen is soft. DIFFERENTIAL DIAGNOSIS:   Abdominal pain. Suspect pancreatitis possible gastritis. Possible gastroenteritis.     DIAGNOSTIC RESULTS     EKG: All EKG's are interpreted by the Emergency Department Physician who either signs or Co-signs this chart in the absence of a cardiologist.      RADIOLOGY: non-plain film images(s) such as CT, Ultrasound and MRI are read by the radiologist.  CT scan of abdomen pelvis with IV contrast read per radiology      CT ABDOMEN PELVIS W IV CONTRAST Additional Contrast? None (Final result)   Result time 05/17/20 12:52:40   Final result by Saima Infante MD (05/17/20 12:52:40)                Impression:    1. Findings of mild mid abdominal ileus. Tiny amount of free fluid in the pelvis. Moderate fluid distention of the stomach. 2. Soft tissue stranding adjacent to the pancreatic tail, consistent with acute pancreatitis        **This report has been created using voice recognition software.  It may contain minor errors which are inherent in voice recognition technology. **    Final report electronically signed by Dr. Saima Infante on 5/17/2020 12:52 PM            Narrative:    CT ABDOMEN AND PELVIS WITH CONTRAST ENHANCEMENT:    CLINICAL INFORMATION: Epigastric pain possible pancreatitis    COMPARISON: 12/4/2017    TECHNIQUE: Multiple axial 5 mm images of the abdomen, pelvis, and lung bases were obtained following the administration of oral and intravenous contrast material (ISOVUE). Computer generated sagittal and coronal images of the abdomen and pelvis were also   reconstructed. ALL CT SCANS AT THIS FACILITY use dose modulation, iterative reconstruction, and/or weight-based dosing when appropriate to reduce radiation dose to as low as reasonably achievable. FINDINGS:     Lung bases: Unremarkable. No infiltrates. No effusions. No lung nodules. Liver: Liver unremarkable. Gallbladder unremarkable    Pancreas, spleen and adrenal glands: The pancreas is normal in appearance, however, there is mild soft tissue stranding adjacent to the pancreatic tail, consistent with pancreatitis the spleen and adrenal glands are unremarkable. Kidneys:  Unremarkable. No renal calculi. No cysts. No mass lesions. No hydronephrosis. .    Bowel/Peritoneum: There is moderate fluid distention of the stomach. The gastric wall is not thickened,. There is mild gaseous distention of a few centrally located small bowel loops, consistent with mild ileus. No free air.  No free LIPASE - Abnormal; Notable for the following components:    Lipase 945.5 (*)     All other components within normal limits   HEPATIC FUNCTION PANEL   TROPONIN   URINE DRUG SCREEN   GLOMERULAR FILTRATION RATE, ESTIMATED   MAGNESIUM   ANION GAP   GLOMERULAR FILTRATION RATE, ESTIMATED       EMERGENCY DEPARTMENT COURSE:   :    Vitals:    05/18/20 0502 05/18/20 0745 05/18/20 0848 05/18/20 1145   BP: (!) 160/88 (!) 158/88  134/81   Pulse: 86 91  75   Resp: 18 20 18   Temp: 98.5 °F (36.9 °C) 97.9 °F (36.6 °C)  98.5 °F (36.9 °C)   TempSrc: Oral Oral  Oral   SpO2: 96% 97% 97% 95%   Weight: 161 lb 6.4 oz (73.2 kg)        Patient was seen history physical exam was performed. The patient was given morphine, Protonix, and Zofran IV. Patient required multiple doses of morphine. He does have pancreatitis. Will admit to the hospital service. See disposition below    CRITICAL CARE:  None    CONSULTS:  Dr. Kumar Million:  None    FINAL IMPRESSION      1. Abdominal pain, unspecified abdominal location    2. Acute pancreatitis, unspecified complication status, unspecified pancreatitis type          DISPOSITION/PLAN   Admit    PATIENT REFERRED TO:  No follow-up provider specified.     DISCHARGE MEDICATIONS:  Discharge Medication List as of 5/18/2020  4:24 PM          (Please note that portions of this note were completed with a voice recognitionprogram.  Efforts were made to edit the dictations but occasionally words are mis-transcribed.)    SUMAN Torres PA  05/17/20 [address]38 [address] SUMAN Balderas  05/20/20 [address] [place] [address] SUMAN Hassan  05/20/20 [address] [place] [address] SUMAN Balderas  05/20/20 1958

## 2020-05-17 NOTE — ED NOTES
Patient transported to Radiology department via [employer] tech in stable condition.        Lori Longo RN  05/17/20 5606

## 2020-05-17 NOTE — ED NOTES
ED to inpatient nurses report    Chief Complaint   Patient presents with    Abdominal Pain      Present to ED from home  LOC: alert and orientated to name, place, date  Vital signs   Vitals:    05/17/20 1105 05/17/20 1111 05/17/20 1126 05/17/20 1219   BP:  (!) 209/186 (!) 171/113 (!) 169/98   Pulse: 118  110 115   Resp: 22 22 22   Temp: 98 °F (36.7 °C)      SpO2: 98%  98% 99%   Weight: 160 lb (72.6 kg)         Oxygen Baseline room air    Current needs required none   LDAs:   Peripheral IV 05/17/20 Left Forearm (Active)   Site Assessment Clean;Dry; Intact 5/17/2020 12:22 PM   Line Status Infusing 5/17/2020 12:22 PM   Dressing Status Intact;Dry;Clean 5/17/2020 12:22 PM   Dressing Intervention New 5/17/2020 11:18 AM     Mobility: Independent  Pending ED orders: urine collection  Present condition: stable     Electronically signed by Jaime Edwards RN on 5/17/2020 at 1:05 PM       Jaime Edwards RN  05/17/20 0932

## 2020-05-17 NOTE — ED NOTES
Pt vomited in CT scan. Provider notified. Pt provided more meds per [facility] for pain/nausea.       Kory Rossi RN  05/17/20 0287

## 2020-05-17 NOTE — ED NOTES
Pt writhing on cot in pain. States \"this is not normal\". Medicated per MAR. Pt agreeable to retry scan at this time. CT notified.      Neil Park RN  05/17/20 0228

## 2020-05-18 VITALS
HEART RATE: 75 BPM | RESPIRATION RATE: 18 BRPM | DIASTOLIC BLOOD PRESSURE: 81 MMHG | SYSTOLIC BLOOD PRESSURE: 134 MMHG | WEIGHT: 161.4 LBS | OXYGEN SATURATION: 95 % | BODY MASS INDEX: 23.16 KG/M2 | TEMPERATURE: 98.5 F

## 2020-05-18 LAB
ALBUMIN SERPL-MCNC: 3.5 G/DL (ref 3.5–5.1)
ALP BLD-CCNC: 56 U/L (ref 38–126)
ALT SERPL-CCNC: 9 U/L (ref 11–66)
ANION GAP SERPL CALCULATED.3IONS-SCNC: 10 MEQ/L (ref 8–16)
AST SERPL-CCNC: 15 U/L (ref 5–40)
BASOPHILS # BLD: 0.2 %
BASOPHILS ABSOLUTE: 0 THOU/MM3 (ref 0–0.1)
BILIRUB SERPL-MCNC: 0.6 MG/DL (ref 0.3–1.2)
BILIRUBIN DIRECT: < 0.2 MG/DL (ref 0–0.3)
BUN BLDV-MCNC: 7 MG/DL (ref 7–22)
CALCIUM SERPL-MCNC: 8.6 MG/DL (ref 8.5–10.5)
CHLORIDE BLD-SCNC: 101 MEQ/L (ref 98–111)
CO2: 22 MEQ/L (ref 23–33)
CREAT SERPL-MCNC: 0.4 MG/DL (ref 0.4–1.2)
EOSINOPHIL # BLD: 1 %
EOSINOPHILS ABSOLUTE: 0.1 THOU/MM3 (ref 0–0.4)
ERYTHROCYTE [DISTWIDTH] IN BLOOD BY AUTOMATED COUNT: 14.3 % (ref 11.5–14.5)
ERYTHROCYTE [DISTWIDTH] IN BLOOD BY AUTOMATED COUNT: 49.5 FL (ref 35–45)
GFR SERPL CREATININE-BSD FRML MDRD: > 90 ML/MIN/1.73M2
GLUCOSE BLD-MCNC: 88 MG/DL (ref 70–108)
HCT VFR BLD CALC: 37 % (ref 42–52)
HEMOGLOBIN: 11.9 GM/DL (ref 14–18)
IMMATURE GRANS (ABS): 0.05 THOU/MM3 (ref 0–0.07)
IMMATURE GRANULOCYTES: 0.5 %
LIPASE: 945.5 U/L (ref 5.6–51.3)
LYMPHOCYTES # BLD: 17.9 %
LYMPHOCYTES ABSOLUTE: 1.7 THOU/MM3 (ref 1–4.8)
MAGNESIUM: 1.8 MG/DL (ref 1.6–2.4)
MCH RBC QN AUTO: 30.5 PG (ref 26–33)
MCHC RBC AUTO-ENTMCNC: 32.2 GM/DL (ref 32.2–35.5)
MCV RBC AUTO: 94.9 FL (ref 80–94)
MONOCYTES # BLD: 11.2 %
MONOCYTES ABSOLUTE: 1.1 THOU/MM3 (ref 0.4–1.3)
NUCLEATED RED BLOOD CELLS: 0 /100 WBC
PLATELET # BLD: 170 THOU/MM3 (ref 130–400)
PMV BLD AUTO: 9.9 FL (ref 9.4–12.4)
POTASSIUM SERPL-SCNC: 3.8 MEQ/L (ref 3.5–5.2)
RBC # BLD: 3.9 MILL/MM3 (ref 4.7–6.1)
SEG NEUTROPHILS: 69.2 %
SEGMENTED NEUTROPHILS ABSOLUTE COUNT: 6.7 THOU/MM3 (ref 1.8–7.7)
SODIUM BLD-SCNC: 133 MEQ/L (ref 135–145)
TOTAL PROTEIN: 5.6 G/DL (ref 6.1–8)
WBC # BLD: 9.7 THOU/MM3 (ref 4.8–10.8)

## 2020-05-18 PROCEDURE — 6360000002 HC RX W HCPCS: Performed by: INTERNAL MEDICINE

## 2020-05-18 PROCEDURE — 80053 COMPREHEN METABOLIC PANEL: CPT

## 2020-05-18 PROCEDURE — 2580000003 HC RX 258: Performed by: INTERNAL MEDICINE

## 2020-05-18 PROCEDURE — 94760 N-INVAS EAR/PLS OXIMETRY 1: CPT

## 2020-05-18 PROCEDURE — 99999 PR OFFICE/OUTPT VISIT,PROCEDURE ONLY: CPT | Performed by: INTERNAL MEDICINE

## 2020-05-18 PROCEDURE — 99232 SBSQ HOSP IP/OBS MODERATE 35: CPT | Performed by: INTERNAL MEDICINE

## 2020-05-18 PROCEDURE — 85025 COMPLETE CBC W/AUTO DIFF WBC: CPT

## 2020-05-18 PROCEDURE — 36415 COLL VENOUS BLD VENIPUNCTURE: CPT

## 2020-05-18 PROCEDURE — 83690 ASSAY OF LIPASE: CPT

## 2020-05-18 PROCEDURE — 6370000000 HC RX 637 (ALT 250 FOR IP): Performed by: INTERNAL MEDICINE

## 2020-05-18 PROCEDURE — 83735 ASSAY OF MAGNESIUM: CPT

## 2020-05-18 PROCEDURE — 2500000003 HC RX 250 WO HCPCS: Performed by: INTERNAL MEDICINE

## 2020-05-18 PROCEDURE — 82248 BILIRUBIN DIRECT: CPT

## 2020-05-18 RX ORDER — PHENOBARBITAL 32.4 MG/1
32.4 TABLET ORAL 2 TIMES DAILY
Status: DISCONTINUED | OUTPATIENT
Start: 2020-05-19 | End: 2020-05-18

## 2020-05-18 RX ORDER — PHENOBARBITAL 32.4 MG/1
64.8 TABLET ORAL 2 TIMES DAILY
Status: DISCONTINUED | OUTPATIENT
Start: 2020-05-19 | End: 2020-05-18

## 2020-05-18 RX ADMIN — KETOROLAC TROMETHAMINE 15 MG: 30 INJECTION, SOLUTION INTRAMUSCULAR at 08:04

## 2020-05-18 RX ADMIN — HYDROMORPHONE HYDROCHLORIDE 0.5 MG: 1 INJECTION, SOLUTION INTRAMUSCULAR; INTRAVENOUS; SUBCUTANEOUS at 13:52

## 2020-05-18 RX ADMIN — ONDANSETRON HYDROCHLORIDE 4 MG: 2 SOLUTION INTRAMUSCULAR; INTRAVENOUS at 05:14

## 2020-05-18 RX ADMIN — FAMOTIDINE 20 MG: 10 INJECTION INTRAVENOUS at 08:04

## 2020-05-18 RX ADMIN — SODIUM CHLORIDE: 9 INJECTION, SOLUTION INTRAVENOUS at 08:04

## 2020-05-18 RX ADMIN — HYDROMORPHONE HYDROCHLORIDE 0.5 MG: 1 INJECTION, SOLUTION INTRAMUSCULAR; INTRAVENOUS; SUBCUTANEOUS at 01:14

## 2020-05-18 RX ADMIN — KETOROLAC TROMETHAMINE 15 MG: 30 INJECTION, SOLUTION INTRAMUSCULAR at 01:49

## 2020-05-18 RX ADMIN — HYDROMORPHONE HYDROCHLORIDE 0.5 MG: 1 INJECTION, SOLUTION INTRAMUSCULAR; INTRAVENOUS; SUBCUTANEOUS at 05:14

## 2020-05-18 RX ADMIN — HYDROMORPHONE HYDROCHLORIDE 0.5 MG: 1 INJECTION, SOLUTION INTRAMUSCULAR; INTRAVENOUS; SUBCUTANEOUS at 09:25

## 2020-05-18 RX ADMIN — PHENOBARBITAL SODIUM 292.5 MG: 65 INJECTION INTRAMUSCULAR; INTRAVENOUS at 11:51

## 2020-05-18 RX ADMIN — KETOROLAC TROMETHAMINE 15 MG: 30 INJECTION, SOLUTION INTRAMUSCULAR at 15:02

## 2020-05-18 RX ADMIN — ACETAMINOPHEN 650 MG: 325 TABLET ORAL at 08:04

## 2020-05-18 ASSESSMENT — PAIN SCALES - GENERAL
PAINLEVEL_OUTOF10: 4
PAINLEVEL_OUTOF10: 5
PAINLEVEL_OUTOF10: 9
PAINLEVEL_OUTOF10: 6
PAINLEVEL_OUTOF10: 6
PAINLEVEL_OUTOF10: 4
PAINLEVEL_OUTOF10: 5

## 2020-05-18 NOTE — PROGRESS NOTES
Hospitalist Progress Note      Patient:  Yoshi Srivastava  YOB: 1970  MRN: [id number]   Acct: [de-identified]     PCP: No primary care provider on file. Date of Admission: 5/17/2020      Chief Complaint:  Nausea, vomiting, and intractable abdominal pain       Assessment/Plan:    1. Acute on Chronic Pancreatitis: Continue IV fluids keep n.p.o. lipase daily is improving, counseled against drinking alcohol as well as cocaine -LFT and lipase tomorrow a.m.   2. Mild Bowel Ileus likely secondary to pancreatitis: NPO. IV fluids. 3. Polysubstance Abuse: abusing cocaine. Educate and  on cessation. 4. PTSD: resume Zoloft and Prazosin       History Of Present Illness:      52 y.o. male who presented to [address] [place] [address] with 3 day history of nausea, vomiting, and epigastric abdominal pain. Reports sharp 10/10 abdominal pain, radiates to his back. Associated with nausea and vomiting. Pt has a pmh of polysubstance abuse (admits to recent cocaine use) and reports abusing his albuterol inhaler. Pt used to be a heavy alcohol drinker with prior episodes of pancreatitis. Pt denies any recent alcohol use. No fevers or chills. No chest pain or sob. No diarrhea, blood in stool. No cough, congestion, sore throat. Pt denies any new medication changes, no history of gallstones or recent travel. In the ED, tachycardic 118. LFT's WNL. Lipase elevated at 2,457.3. CT A/P showing soft tissue stranding adjacent to the pancreatic tail consistent with acute pancreatitis with mild bowel ileus. Will admit for acute pancreatitis     Subjective:- (Last 24 hours)        Past medical history, family history, social history and allergies reviewed again and is unchanged since admission. ROS (12 point review of systems completed. Pertinent positives noted.  Otherwise ROS is negative)      Scheduled Meds:   sertraline  50 mg Oral Daily    prazosin  1 mg Oral Nightly    sodium chloride flush  10 mL Intravenous 2 times per day    enoxaparin  40 mg Subcutaneous Q24H    nicotine  1 patch Transdermal Daily    famotidine (PEPCID) injection  20 mg Intravenous BID     Continuous Infusions:   sodium chloride 125 mL/hr at 05/18/20 0845     PRN Meds:.sodium chloride flush, acetaminophen **OR** acetaminophen, polyethylene glycol, promethazine **OR** ondansetron, HYDROmorphone, hydrALAZINE, zolpidem, ketorolac   Diet:  Diet NPO Effective Now    REVIEW OF SYSTEMS:   Pertinent positives as noted in the HPI. All other systems reviewed and negative. PHYSICAL EXAM:    /81   Pulse 75   Temp 98.5 °F (36.9 °C) (Oral)   Resp 18   Wt 161 lb 6.4 oz (73.2 kg)   SpO2 95%   BMI 23.16 kg/m²     General appearance:  No apparent distress, appears stated age and cooperative. HEENT:  Normal cephalic, atraumatic without obvious deformity. Pupils equal, round, and reactive to light. Extra ocular muscles intact. Conjunctivae/corneas clear. Neck: Supple, with full range of motion. No jugular venous distention. Trachea midline. Respiratory:  Normal respiratory effort. Clear to auscultation, bilaterally without Rales/Wheezes/Rhonchi. Cardiovascular:  Regular rate and rhythm with normal S1/S2 without murmurs, rubs or gallops. Abdomen: Soft, epigastric abdominal pain to light and deep palpation, no rebound or guarding. Musculoskeletal:  No clubbing, cyanosis or edema bilaterally. Full range of motion without deformity. Skin: Skin color, texture, turgor normal.  No rashes or lesions. Neurologic:  Neurovascularly intact without any focal sensory/motor deficits.  Cranial nerves: II-XII intact, grossly non-focal.  Psychiatric:  Alert and oriented, thought content appropriate, normal insight  Capillary Refill: Brisk,< 3 seconds   Peripheral Pulses: +2 palpable, equal bilaterally       Labs:     Recent Labs     05/17/20  1109 05/18/20  0638   WBC 13.2* 9.7   HGB 15.2 11.9*   HCT 46.6 37.0*    170 Recent Labs     05/17/20  1109 05/18/20  0638   * 133*   K 4.1 3.8   CL 94* 101   CO2 23 22*   BUN 12 7   CREATININE 0.8 0.4   CALCIUM 10.1 8.6     Recent Labs     05/17/20  1109 05/18/20  0638   AST 25 15   ALT 15 9*   BILIDIR <0.2 <0.2   BILITOT 0.8 0.6   ALKPHOS 89 56     No results for input(s): INR in the last 72 hours. No results for input(s): [place] in the last 72 hours. Urinalysis:      Lab Results   Component Value Date    NITRU NEGATIVE 05/17/2020    WBCUA 0-2 05/17/2020    BACTERIA NONE SEEN 05/17/2020    RBCUA 0-2 05/17/2020    BLOODU TRACE 05/17/2020    GLUCOSEU NEGATIVE 05/17/2020       Code Status: Full Code            Thank you No primary care provider on file. for the opportunity to be involved in this patient's care.     Electronically signed by Stephani Nguyen MD on 5/18/2020 at 3:31 PM

## 2020-05-18 NOTE — CARE COORDINATION
20, 3:02 PM EDT  DISCHARGE PLANNING EVALUATION:    Ermelinda Stack       Admitted from: ED   [facility] day: 1   Location: Replaced by [facility]- Reason for admit: Acute on chronic pancreatitis ([facility] [address] [K85.90, K86.1] Status: IP  Admit order signed?: yes  PMH:  has a past medical history of Asthma, DDD (degenerative disc disease), lumbar, Pancreatitis, and PTSD (post-traumatic stress disorder). Procedure: na  Pertinent abnormal Imagin/17  CT abdomen pelvis  1. Findings of mild mid abdominal ileus. Tiny amount of free fluid in the pelvis. Moderate fluid distention of the stomach. Soft tissue stranding adjacent to the pancreatic tail, consistent with acute pancreatitis. Medications:  Scheduled Meds:   PHENobarbital IVPB  4 mg/kg Intravenous Q4H    Followed by   Kylee Bishop ON 2020] PHENobarbital  64.8 mg Oral BID    Followed by   Kylee Bishop ON 2020] PHENobarbital  32.4 mg Oral BID    sertraline  50 mg Oral Daily    prazosin  1 mg Oral Nightly    sodium chloride flush  10 mL Intravenous 2 times per day    enoxaparin  40 mg Subcutaneous Q24H    nicotine  1 patch Transdermal Daily    famotidine (PEPCID) injection  20 mg Intravenous BID     Continuous Infusions:   sodium chloride 125 mL/hr at 20 0845      Pertinent Info/Orders/Treatment Plan: To ED with epigastric dental pain is been going on for 3 days. NPO, IVF, Pepcid IV, Phenobarbital IV, Nicotine patch, up in room, Na 133, Lipase 945 (was 2,457), on room air, pain/nausea control, abdominal assessments. Diet: Diet NPO Effective Now   Smoking status:  reports that he has been smoking cigars. He has been smoking about 0.50 packs per day. He has never used smokeless tobacco.   PCP: No primary care provider on file.  thru [facility]  Readmission 30 days or less: no  Readmission Risk Score: 11%    Discharge Planning Evaluation  Current Residence:  Private Residence  Living Arrangements:  Alone   Support Systems:  Family Members  Current Services PTA:     Potential Assistance Needed:  N/A  Potential Assistance Purchasing Medications:  No  Does patient want to participate in local refill/ meds to beds program?  Yes  Type of Home Care Services:  None  Patient expects to be discharged to:  home  Expected Discharge date:  05/20/20  Follow Up Appointment: Best Day/ Time: Thursday AM    Patient Goals/Plan/Treatment Preferences: Spoke with Saida Joshi; he is from [place] [address] and [facility]. [facility] He is here in [place] visiting his father and developed pancreatitis (has had before). [facility] and [facility] andhe does not want [facility] notified re: his admission. Will follow; declined needs. Transportation/Food Security/Housekeeping Addressed:  No issues identified.     Evaluation: no